# Patient Record
(demographics unavailable — no encounter records)

---

## 2024-11-11 NOTE — ASSESSMENT
[FreeTextEntry1] : 77F PMH former heavy smoker, very severe COPD on home O2 2L QHS, COVID-19 (11/2023, 02/2024), CAD s/p 3v CABG (2023), HFpEF, HLD who presents for f/u visit, telephonic.   - Feeling stable overall. Using Advair 250 and Spiriva daily. Had recent CT, stable nodules.   - Repeat CT 10/2025 - ordered - F/u in 4 mo time routine  The patient expressed understanding and agreement with the plan as outlined above and accepts responsibility to be compliant with any recommended testing, treatment, and follow-up visits.  All relevant questions and concerns were addressed.  22 minutes of time were spent on the encounter. Medical records were reviewed, including but not limited to hospital records, outpatient records, laboratory data, and diagnostic imaging studies. Greater than 50% of the face-to-face encounter time was spent on counseling and/or coordination of care.  Gómez Fournier MD, [facility] Pulmonary [facility] Sleep Medicine at [place] [address] [place] [place], [place] [address] T: [phone] F: [phone]

## 2024-11-11 NOTE — REASON FOR VISIT
[Home] : at home, [unfilled] , at the time of the visit. [Medical Office: ([facility])___] : at the medical office located in  [Verbal consent obtained from patient] : the patient, [unfilled] [Follow-Up] : a follow-up visit [Abnormal CXR/ Chest CT] : an abnormal CXR/ chest CT [COPD] : COPD

## 2024-11-11 NOTE — RESULTS/DATA
[TextEntry] : [facility] EXAM: [id number] - CT CHEST  - ORDERED BY: SHARMAINE GAMBINO   PROCEDURE DATE:  10/28/2024    INTERPRETATION:  CLINICAL INFORMATION: Follow-up pulmonary nodule. History of COPD.  COMPARISON: CT angiogram aorta 7/31/2024, CT chest 6/11/2023, 11/9/2022, CT angiogram pulmonary artery 3/27/2022  CONTRAST/COMPLICATIONS: IV Contrast: None. Oral Contrast: None. Complications: None reported at time of study completion.  PROCEDURE: CT scan of the chest was obtained without intravenous contrast.  FINDINGS:  AIRWAYS/LUNGS/PLEURA: Tracheal bronchial secretions.Emphysema and bronchiectasis.  Nearly resolved prior lingular nodular opacity. Redemonstrated right middle lobe atelectasis and partial atelectasis. Scattered lung nodules measure up to 4 mm are predominantly clustered are unchanged from the prior study but increased from 2022. No pleural effusion.  MEDIASTINUM AND JAKI: No lymphadenopathy.  VESSELS: Aortic and coronary artery atherosclerotic calcifications. Ascending aorta measures up to 4.5 cm at the level of the right pulmonary artery (3-77), unchanged.  HEART: Heart size is normal. No pericardial effusion. CABG.  VISUALIZED UPPER ABDOMEN: Cholelithiasis.  CHEST WALL AND BONES: Postsurgical change of the anterior chest wall. Median sternotomy. Degenerative changes of the bones.  IMPRESSION:  Nearly resolved prior lingular nodular opacity.  Scattered lung nodules measure up to 4 mm are predominantly clustered likely representing mucoid impaction are unchanged from the prior study but increased from 2022. Continued follow-up chest CT recommended  Additional bilateral pulmonary nodules measuring up to 4 mm in the lingula as described above. These can be followed with noncontrast CT chest in 1 year.    --- End of Report ---      MORRIS EDWARDS MD; Resident Radiologist This document has been electronically signed. SULEMA GARRETT MD; Attending Radiologist This document has been electronically signed. Nov 7 2024  6:55PM

## 2024-11-11 NOTE — HISTORY OF PRESENT ILLNESS
[Former] : former [TextBox_4] : 77F PMH former heavy smoker, very severe COPD on home O2 2L QHS, COVID-19 (11/2023, 02/2024), CAD s/p 3v CABG (2023), HFpEF, HLD who presents for f/u visit, telephonic. Feeling stable overall. Using Advair 250 and Spiriva daily. Had recent CT, stable nodules [TextBox_11] : 2 [TextBox_13] : 30 [YearQuit] : 2004

## 2025-03-10 NOTE — ASSESSMENT
[FreeTextEntry1] : 77F PMH former heavy smoker, very severe COPD on home O2 2L QHS, COVID-19 (11/2023, 02/2024), CAD s/p 3v CABG (2023), HFpEF, HLD who presents for f/u visit.   - C/w Advair 250-50 + Spiriva. - Repeat CT due 10/2025 - ordered - Lungs clear on exam - Can f/u in 4 mo time or sooner PRN  The patient expressed understanding and agreement with the plan as outlined above and accepts responsibility to be compliant with any recommended testing, treatment, and follow-up visits.  All relevant questions and concerns were addressed.  20 minutes of time were spent on the encounter. Medical records were reviewed, including but not limited to hospital records, outpatient records, laboratory data, and diagnostic imaging studies. Greater than 50% of the face-to-face encounter time was spent on counseling and/or coordination of care.   Gómez Fournier MD, [facility] Pulmonary and Sleep Medicine at [place] [address] [place] [place], [place] [address] T: [phone] F: [phone]

## 2025-03-10 NOTE — HISTORY OF PRESENT ILLNESS
[Former] : former [TextBox_4] : 77F PMH former heavy smoker, very severe COPD on home O2 2L QHS, COVID-19 (11/2023, 02/2024), CAD s/p 3v CABG (2023), HFpEF, HLD who presents for f/u visit. Doing well. On Advair 250-50 + Spiriva. No recent flare-ups.  [TextBox_11] : 2 [TextBox_13] : 30 [YearQuit] : 2004

## 2025-03-28 NOTE — HISTORY OF PRESENT ILLNESS
[FreeTextEntry1] : 78F, Pmhx of COPD, HLD, arthritis, Left hip replacement, Mid ascending aortic aneurysm, CAD now s/p CABG X 3 (LIMA-LAD, SVG-OM, SVG-PDA) on 5/1/23 with Dr. Hanna. Post op patient had AFib, was placed on AC currently off.  Patient comes for a follow up visit.  Patient denies chest pain, palpitations, TAVERAS, PND, orthopnea, leg edema, claudication, no syncope.

## 2025-03-28 NOTE — CARDIOLOGY SUMMARY
[de-identified] : 12//7/23: NSR, LAE, Poor R wave progression, nonspecific T wave abnormalities. 5/23/24: NSR, Nonspecific T wave.  3/28/25: NSR, low voltage, precordial TWI [de-identified] : 2/13/23:  NST  that showed a moderate size of severe inferior and apical wall ischemia [de-identified] : 11/8/22: Normal global biventricular ventricular systolic function, (Grade I diastolic dysfunction)\par  5/9/23: Normal biventricular systolic function, Grade II diastolic dysfunction, mild to mod AR\par   [de-identified] : Ct chest 22: Coronary artery calcifications. Dilated 4.4 cm mid ascending aorta CT angio aorta 24: *  Aortic root: 3.7 cm ([id number]), unchanged. *  Mid ascendin.5 cm, unchanged. *  Transverse arch: 3.7 cm, unchanged. *  Mid descending: 3.5 cm, unchanged. *  Diaphragm level: 2.9 cm, unchanged. [de-identified] : [facility] 3/10/23:  LM 40%, mLAD 80%, OM1 75%, OM2 , RCA  filled by collaterals from the LAD.   [de-identified] : US AAA screening: No evidence of abdominal aortic aneurysm. Proximal Aorta: 2.8 cm. Mid Aorta: 2.0 cm. Distal Aorta: 1.5 cm.\par  4/6/23 carotid US:  No significant hemodynamic stenosis of the left carotid artery. 50-69% right internal carotid artery stenosis. [de-identified] : 11/10/22: , HDL 65, ,

## 2025-03-28 NOTE — ASSESSMENT
[FreeTextEntry1] : 78F, Pmhx of COPD, HLD, arthritis, Left hip replacement, Mid ascending aortic aneurysm, CAD now s/p CABG X 3 (LIMA-LAD, SVG-OM, SVG-PDA) on 5/1/23 with Dr. Hanna. Post op patient had AFib, was placed on AC currently off.  Patient comes for a follow up visit.  Patient denies chest pain, palpitations, TAVERAS, PND, orthopnea, leg edema, claudication, no syncope.   #Mid ascending aortic aneurysm stable dilatation at 4.5cm will continue yearly surveillance., CT angio aorta ordered  #CAD S/P CABG on aspirin  Continue statin.    #Afib post op refused ekg today [facility] 7/13/23- 7/26/23 No AFib, No SVT, no heart blocks, No Pauses, no VT, No PACs, PVC burden <1% will continue to hold off AC.  #HFpEF euvolemic lasix PRN   RTC 6 months

## 2025-03-28 NOTE — ADDENDUM
[FreeTextEntry1] : Patient is planned for a light dental cleaning procedure  at present time there are no CV contraindications for the procedure intended.

## 2025-07-14 NOTE — HISTORY OF PRESENT ILLNESS
[Former] : former [TextBox_4] : 77F PMH former heavy smoker, very severe COPD on home O2 2L QHS, COVID-19 (11/2023, 02/2024), CAD s/p 3v CABG (2023), HFpEF, HLD who presents for f/u visit. Doing well. On generic Advair 250-50 + Spiriva. She feels the generic Advair does not work as well.  [TextBox_11] : 2 [TextBox_13] : 30 [YearQuit] : 2004

## 2025-07-14 NOTE — ASSESSMENT
[FreeTextEntry1] : 77F PMH former heavy smoker, very severe COPD on home O2 2L QHS, COVID-19 (11/2023, 02/2024), CAD s/p 3v CABG (2023), HFpEF, HLD who presents for f/u visit.   - Increasing generic Advair to 500-50 as she feels the 250-50 is not working as well - Will c/w Spiriva - Due for CT scan 10/2025 - she also has CT chest aorta with/without IV contrast - I will cancel my CT chest and advised her to proceed with the aorta study instead - Will f/u in 11/2025 with spirometry  The patient expressed understanding and agreement with the plan as outlined above and accepts responsibility to be compliant with any recommended testing, treatment, and follow-up visits.  All relevant questions and concerns were addressed.   30 minutes of time were spent on the encounter. Medical records were reviewed, including but not limited to hospital records, outpatient records, laboratory data, and diagnostic imaging studies. Greater than 50% of the face-to-face encounter time was spent on counseling and/or coordination of care.    Gómez Fournier MD, [facility] Pulmonary & Critical Care Medicine [facility] Pulmonary and Sleep Medicine at [place] [address] [place] [place], [place] [address] T: [phone] F: [phone]